# Patient Record
Sex: MALE | URBAN - NONMETROPOLITAN AREA
[De-identification: names, ages, dates, MRNs, and addresses within clinical notes are randomized per-mention and may not be internally consistent; named-entity substitution may affect disease eponyms.]

---

## 2024-09-23 ENCOUNTER — OFFICE VISIT (OUTPATIENT)
Dept: DERMATOLOGY | Age: 69
End: 2024-09-23
Payer: MEDICARE

## 2024-09-23 VITALS — SYSTOLIC BLOOD PRESSURE: 130 MMHG | DIASTOLIC BLOOD PRESSURE: 74 MMHG | RESPIRATION RATE: 14 BRPM | HEART RATE: 53 BPM

## 2024-09-23 DIAGNOSIS — D48.9 NEOPLASM OF UNCERTAIN BEHAVIOR: Primary | ICD-10-CM

## 2024-09-23 PROBLEM — H53.461 HEMIANOPIA, HOMONYMOUS, RIGHT: Status: ACTIVE | Noted: 2022-04-04

## 2024-09-23 PROBLEM — I69.30 SEQUELAE, POST-STROKE: Status: ACTIVE | Noted: 2022-04-04

## 2024-09-23 PROBLEM — I25.10 CORONARY ATHEROSCLEROSIS: Status: ACTIVE | Noted: 2022-05-03

## 2024-09-23 PROBLEM — N18.31 STAGE 3A CHRONIC KIDNEY DISEASE (HCC): Status: ACTIVE | Noted: 2022-05-02

## 2024-09-23 PROBLEM — R94.30 CARDIAC LV EJECTION FRACTION >40%: Status: ACTIVE | Noted: 2022-05-03

## 2024-09-23 PROBLEM — M79.671 PAIN IN BOTH FEET: Status: ACTIVE | Noted: 2022-06-08

## 2024-09-23 PROBLEM — F01.B0 MODERATE VASCULAR DEMENTIA WITHOUT BEHAVIORAL DISTURBANCE, PSYCHOTIC DISTURBANCE, MOOD DISTURBANCE, OR ANXIETY (HCC): Status: ACTIVE | Noted: 2023-08-12

## 2024-09-23 PROBLEM — F01.50 MULTI-INFARCT DEMENTIA WITHOUT BEHAVIORAL DISTURBANCE (HCC): Status: ACTIVE | Noted: 2022-05-02

## 2024-09-23 PROBLEM — J44.9 COPD (CHRONIC OBSTRUCTIVE PULMONARY DISEASE) (HCC): Status: ACTIVE | Noted: 2023-09-15

## 2024-09-23 PROBLEM — F41.9 ANXIETY: Status: ACTIVE | Noted: 2022-06-08

## 2024-09-23 PROBLEM — R53.81 DEBILITATED PATIENT: Status: ACTIVE | Noted: 2022-05-02

## 2024-09-23 PROBLEM — H60.319: Status: ACTIVE | Noted: 2023-09-15

## 2024-09-23 PROBLEM — R53.1 GENERALIZED WEAKNESS: Status: ACTIVE | Noted: 2023-09-15

## 2024-09-23 PROBLEM — I21.3 ACUTE ST ELEVATION MYOCARDIAL INFARCTION (HCC): Status: ACTIVE | Noted: 2022-05-02

## 2024-09-23 PROBLEM — R25.1 TREMOR: Status: ACTIVE | Noted: 2023-09-15

## 2024-09-23 PROBLEM — R25.2 SPASTICITY: Status: ACTIVE | Noted: 2022-04-04

## 2024-09-23 PROBLEM — E78.2 MIXED HYPERLIPIDEMIA: Status: ACTIVE | Noted: 2022-05-02

## 2024-09-23 PROBLEM — I67.9 CEREBROVASCULAR DISEASE: Status: ACTIVE | Noted: 2022-04-04

## 2024-09-23 PROBLEM — Z86.79 H/O CHF: Status: ACTIVE | Noted: 2022-05-02

## 2024-09-23 PROBLEM — L60.0 INGROWN NAIL: Status: ACTIVE | Noted: 2022-06-08

## 2024-09-23 PROBLEM — N18.30 ACUTE RENAL FAILURE WITH ACUTE TUBULAR NECROSIS SUPERIMPOSED ON STAGE 3 CHRONIC KIDNEY DISEASE (HCC): Status: ACTIVE | Noted: 2023-08-12

## 2024-09-23 PROBLEM — L91.8 SKIN TAG: Status: ACTIVE | Noted: 2023-09-15

## 2024-09-23 PROBLEM — G81.91 RIGHT HEMIPARESIS (HCC): Status: ACTIVE | Noted: 2023-08-12

## 2024-09-23 PROBLEM — I21.19 ACUTE ST ELEVATION MYOCARDIAL INFARCTION (STEMI) OF INFERIOR WALL (HCC): Status: ACTIVE | Noted: 2022-05-02

## 2024-09-23 PROBLEM — R56.9 SEIZURES (HCC): Status: ACTIVE | Noted: 2023-09-15

## 2024-09-23 PROBLEM — R10.11 RIGHT UPPER QUADRANT ABDOMINAL PAIN: Status: ACTIVE | Noted: 2023-08-12

## 2024-09-23 PROBLEM — Z86.59 HISTORY OF DEPRESSION: Status: ACTIVE | Noted: 2022-05-02

## 2024-09-23 PROBLEM — I10 PRIMARY HYPERTENSION: Status: ACTIVE | Noted: 2023-09-15

## 2024-09-23 PROBLEM — E78.5 HYPERLIPIDEMIA: Status: ACTIVE | Noted: 2023-09-15

## 2024-09-23 PROBLEM — K81.9 CHOLECYSTITIS: Status: ACTIVE | Noted: 2023-08-12

## 2024-09-23 PROBLEM — N17.0 ACUTE RENAL FAILURE WITH ACUTE TUBULAR NECROSIS SUPERIMPOSED ON STAGE 3 CHRONIC KIDNEY DISEASE (HCC): Status: ACTIVE | Noted: 2023-08-12

## 2024-09-23 PROBLEM — Z86.73 HISTORY OF CEREBROVASCULAR ACCIDENT: Status: ACTIVE | Noted: 2022-05-02

## 2024-09-23 PROBLEM — I48.21 PERMANENT ATRIAL FIBRILLATION (HCC): Status: ACTIVE | Noted: 2022-02-24

## 2024-09-23 PROBLEM — M79.672 PAIN IN BOTH FEET: Status: ACTIVE | Noted: 2022-06-08

## 2024-09-23 PROBLEM — K83.9 BILE LEAK: Status: ACTIVE | Noted: 2023-08-14

## 2024-09-23 PROBLEM — G40.909 SEIZURE DISORDER (HCC): Status: ACTIVE | Noted: 2022-05-02

## 2024-09-23 PROBLEM — Z72.0 TOBACCO ABUSE: Status: ACTIVE | Noted: 2022-08-10

## 2024-09-23 PROCEDURE — 11102 TANGNTL BX SKIN SINGLE LES: CPT | Performed by: PHYSICIAN ASSISTANT

## 2024-09-23 PROCEDURE — 99204 OFFICE O/P NEW MOD 45 MIN: CPT | Performed by: PHYSICIAN ASSISTANT

## 2024-09-23 RX ORDER — METOPROLOL SUCCINATE 50 MG/1
50 TABLET, EXTENDED RELEASE ORAL DAILY
COMMUNITY

## 2024-09-23 RX ORDER — ATORVASTATIN CALCIUM 80 MG/1
80 TABLET, FILM COATED ORAL DAILY
COMMUNITY

## 2024-09-23 RX ORDER — NITROGLYCERIN 0.4 MG/1
0.4 TABLET SUBLINGUAL EVERY 5 MIN PRN
COMMUNITY
Start: 2022-05-05

## 2024-09-23 RX ORDER — LEVETIRACETAM 500 MG/1
500 TABLET ORAL DAILY
COMMUNITY
Start: 2022-12-12

## 2024-09-23 RX ORDER — ALBUTEROL SULFATE 90 UG/1
2 INHALANT RESPIRATORY (INHALATION) EVERY 4 HOURS PRN
COMMUNITY
Start: 2022-12-22

## 2024-09-23 RX ORDER — APIXABAN 5 MG/1
5 TABLET, FILM COATED ORAL 2 TIMES DAILY
COMMUNITY
Start: 2023-08-21

## 2024-09-23 RX ORDER — HYDROCODONE BITARTRATE AND ACETAMINOPHEN 5; 325 MG/1; MG/1
1 TABLET ORAL EVERY 6 HOURS PRN
COMMUNITY

## 2024-09-23 RX ORDER — LEVOTHYROXINE SODIUM 88 UG/1
88 TABLET ORAL DAILY
COMMUNITY
Start: 2024-09-12

## 2024-09-23 RX ORDER — EMPAGLIFLOZIN 10 MG/1
10 TABLET, FILM COATED ORAL DAILY
COMMUNITY
Start: 2024-09-09

## 2024-09-23 RX ORDER — EZETIMIBE 10 MG/1
10 TABLET ORAL DAILY
COMMUNITY
Start: 2024-09-09

## 2024-09-23 RX ORDER — LOPERAMIDE HCL 2 MG
2 CAPSULE ORAL 4 TIMES DAILY PRN
COMMUNITY

## 2024-09-23 RX ORDER — SERTRALINE HYDROCHLORIDE 25 MG/1
25 TABLET, FILM COATED ORAL DAILY
COMMUNITY

## 2024-09-23 RX ORDER — AMMONIUM LACTATE 12 G/100G
CREAM TOPICAL
COMMUNITY
Start: 2024-06-25

## 2024-09-23 RX ORDER — LIDOCAINE HYDROCHLORIDE AND EPINEPHRINE 10; 10 MG/ML; UG/ML
3 INJECTION, SOLUTION INFILTRATION; PERINEURAL ONCE
Status: COMPLETED | OUTPATIENT
Start: 2024-09-23 | End: 2024-09-23

## 2024-09-23 RX ORDER — ACETAMINOPHEN 500 MG
500 TABLET ORAL EVERY 6 HOURS PRN
COMMUNITY

## 2024-09-23 RX ORDER — AMIODARONE HYDROCHLORIDE 200 MG/1
200 TABLET ORAL DAILY
COMMUNITY
Start: 2022-07-27

## 2024-09-23 RX ORDER — LISINOPRIL 20 MG/1
20 TABLET ORAL DAILY
COMMUNITY

## 2024-09-23 RX ORDER — ONDANSETRON 4 MG/1
4 TABLET, FILM COATED ORAL EVERY 8 HOURS PRN
COMMUNITY

## 2024-09-23 RX ORDER — LEVOTHYROXINE SODIUM 25 UG/1
25 TABLET ORAL DAILY
COMMUNITY
End: 2024-09-23

## 2024-09-23 RX ORDER — METOPROLOL TARTRATE 25 MG/1
25 TABLET, FILM COATED ORAL DAILY
COMMUNITY

## 2024-09-23 RX ORDER — CLOPIDOGREL BISULFATE 75 MG/1
75 TABLET ORAL DAILY
COMMUNITY
Start: 2024-09-16

## 2024-09-23 RX ORDER — AMLODIPINE BESYLATE 5 MG/1
5 TABLET ORAL DAILY
COMMUNITY
Start: 2023-02-01

## 2024-09-23 RX ADMIN — LIDOCAINE HYDROCHLORIDE AND EPINEPHRINE 6 ML: 10; 10 INJECTION, SOLUTION INFILTRATION; PERINEURAL at 15:58

## 2024-09-24 ENCOUNTER — LAB (OUTPATIENT)
Dept: LAB | Age: 69
End: 2024-09-24

## 2024-11-25 ENCOUNTER — PROCEDURE VISIT (OUTPATIENT)
Dept: DERMATOLOGY | Age: 69
End: 2024-11-25
Payer: MEDICARE

## 2024-11-25 VITALS
BODY MASS INDEX: 36.37 KG/M2 | SYSTOLIC BLOOD PRESSURE: 140 MMHG | HEIGHT: 68 IN | RESPIRATION RATE: 20 BRPM | HEART RATE: 64 BPM | DIASTOLIC BLOOD PRESSURE: 62 MMHG | WEIGHT: 240 LBS

## 2024-11-25 DIAGNOSIS — C44.519 BCC (BASAL CELL CARCINOMA), BACK: Primary | ICD-10-CM

## 2024-11-25 PROCEDURE — 11603 EXC TR-EXT MAL+MARG 2.1-3 CM: CPT | Performed by: PHYSICIAN ASSISTANT

## 2024-11-25 PROCEDURE — 99214 OFFICE O/P EST MOD 30 MIN: CPT | Performed by: PHYSICIAN ASSISTANT

## 2024-11-26 ENCOUNTER — LAB (OUTPATIENT)
Dept: LAB | Age: 69
End: 2024-11-26

## 2024-12-09 ENCOUNTER — NURSE ONLY (OUTPATIENT)
Dept: DERMATOLOGY | Age: 69
End: 2024-12-09

## 2024-12-09 DIAGNOSIS — C44.519 BCC (BASAL CELL CARCINOMA), BACK: Primary | ICD-10-CM

## 2024-12-09 NOTE — PROGRESS NOTES
Pt comes in for suture removal of left upper back excision on 11/25/24. Incision well healed with no signs or symptoms of infection. Area cleansed with alcohol swab and running sutures removed without difficulty. After care instructions given to daughter. All questions answered. Will call when pathology results come back. Pt and daughter voiced understanding.